# Patient Record
Sex: FEMALE | Race: WHITE | NOT HISPANIC OR LATINO | Employment: FULL TIME | URBAN - NONMETROPOLITAN AREA
[De-identification: names, ages, dates, MRNs, and addresses within clinical notes are randomized per-mention and may not be internally consistent; named-entity substitution may affect disease eponyms.]

---

## 2017-03-13 RX ORDER — NORETHINDRONE ACETATE AND ETHINYL ESTRADIOL 1; 20 MG/1; UG/1
TABLET ORAL
Qty: 28 TABLET | Refills: 0 | Status: SHIPPED | OUTPATIENT
Start: 2017-03-13 | End: 2017-04-27

## 2017-04-03 RX ORDER — NORETHINDRONE ACETATE AND ETHINYL ESTRADIOL 1; 20 MG/1; UG/1
TABLET ORAL
Qty: 63 TABLET | Refills: 0 | Status: SHIPPED | OUTPATIENT
Start: 2017-04-03 | End: 2017-04-27 | Stop reason: SDUPTHER

## 2017-04-27 ENCOUNTER — PROCEDURE VISIT (OUTPATIENT)
Dept: OBSTETRICS AND GYNECOLOGY | Facility: CLINIC | Age: 37
End: 2017-04-27

## 2017-04-27 VITALS
DIASTOLIC BLOOD PRESSURE: 74 MMHG | HEART RATE: 94 BPM | WEIGHT: 215 LBS | HEIGHT: 64 IN | BODY MASS INDEX: 36.7 KG/M2 | SYSTOLIC BLOOD PRESSURE: 124 MMHG

## 2017-04-27 DIAGNOSIS — Z30.41 SURVEILLANCE OF CONTRACEPTIVE PILL: Primary | ICD-10-CM

## 2017-04-27 PROCEDURE — 99213 OFFICE O/P EST LOW 20 MIN: CPT | Performed by: NURSE PRACTITIONER

## 2017-04-27 RX ORDER — NORETHINDRONE ACETATE AND ETHINYL ESTRADIOL 1; .02 MG/1; MG/1
1 TABLET ORAL DAILY
Qty: 90 TABLET | Refills: 4 | Status: SHIPPED | OUTPATIENT
Start: 2017-04-27 | End: 2018-05-30 | Stop reason: SDUPTHER

## 2017-04-27 NOTE — PROGRESS NOTES
Subjective   Jacqueline Horton is a 36 y.o. Here for birth control refills has no complaints.    Gynecologic Exam   The patient's pertinent negatives include no genital itching, genital lesions, genital odor, genital rash, missed menses, pelvic pain, vaginal bleeding or vaginal discharge. Pertinent negatives include no dysuria, headaches, nausea, rash or vomiting. She is sexually active. No, her partner does not have an STD. She uses oral contraceptives for contraception. Her menstrual history has been regular.   LMP- 2 weeks ago; usually last about 5 days and are normal flow with mild cramping. The last 2 periods were heavier than usual but she's thinking it was b/c she was sick and under a lot of stress the past 2 months.    Last pap- 3/10/16 normal with negative HPV    The following portions of the patient's history were reviewed and updated as appropriate: allergies, current medications, past family history, past medical history, past social history, past surgical history and problem list.    Review of Systems   Respiratory: Negative for apnea, chest tightness and shortness of breath.    Cardiovascular: Negative for chest pain, palpitations and leg swelling.   Gastrointestinal: Negative for nausea and vomiting.   Genitourinary: Negative for difficulty urinating, dyspareunia, dysuria, genital sores, menstrual problem, missed menses, pelvic pain, vaginal bleeding, vaginal discharge and vaginal pain.   Musculoskeletal: Negative for gait problem and myalgias.   Skin: Negative for color change and rash.   Neurological: Negative for weakness, light-headedness and headaches.   Hematological: Negative for adenopathy.   Psychiatric/Behavioral: Negative for dysphoric mood and sleep disturbance. The patient is not nervous/anxious.        Objective   Physical Exam   Constitutional: She is oriented to person, place, and time. She appears well-developed and well-nourished.   Cardiovascular: Normal rate, regular rhythm,  normal heart sounds and intact distal pulses.    Pulmonary/Chest: Effort normal and breath sounds normal.   Genitourinary:   Genitourinary Comments: Pap smear not indicated, no GYN complaints. Pelvic exam deferred.   Neurological: She is alert and oriented to person, place, and time.   Skin: Skin is warm, dry and intact.   Psychiatric: She has a normal mood and affect. Her behavior is normal.   Nursing note and vitals reviewed.        Assessment/Plan   Jacqueline was seen today for gynecologic exam.    Diagnoses and all orders for this visit:    Surveillance of contraceptive pill    Other orders  -     norethindrone-ethinyl estradiol (JUNEL 1/20) 1-20 MG-MCG per tablet; Take 1 tablet by mouth Daily.      Continue with OCP. If periods continue to be heavy over the next few cycles she is to call and request a change in the progesterone to help manage heavy bleeding.

## 2017-10-02 RX ORDER — NORETHINDRONE ACETATE AND ETHINYL ESTRADIOL 1; 20 MG/1; UG/1
TABLET ORAL
Qty: 21 TABLET | Refills: 4 | Status: SHIPPED | OUTPATIENT
Start: 2017-10-02 | End: 2018-06-28

## 2018-05-02 RX ORDER — NORETHINDRONE ACETATE AND ETHINYL ESTRADIOL 1; 20 MG/1; UG/1
1 TABLET ORAL DAILY
Qty: 63 TABLET | Refills: 0 | OUTPATIENT
Start: 2018-05-02

## 2018-05-30 RX ORDER — NORETHINDRONE ACETATE AND ETHINYL ESTRADIOL 1; .02 MG/1; MG/1
1 TABLET ORAL DAILY
Qty: 90 TABLET | Refills: 0 | Status: SHIPPED | OUTPATIENT
Start: 2018-05-30 | End: 2018-06-28 | Stop reason: SDUPTHER

## 2018-06-28 ENCOUNTER — OFFICE VISIT (OUTPATIENT)
Dept: OBSTETRICS AND GYNECOLOGY | Facility: CLINIC | Age: 38
End: 2018-06-28

## 2018-06-28 ENCOUNTER — APPOINTMENT (OUTPATIENT)
Dept: LAB | Facility: HOSPITAL | Age: 38
End: 2018-06-28

## 2018-06-28 VITALS
DIASTOLIC BLOOD PRESSURE: 74 MMHG | HEART RATE: 93 BPM | HEIGHT: 64 IN | WEIGHT: 215 LBS | SYSTOLIC BLOOD PRESSURE: 121 MMHG | BODY MASS INDEX: 36.7 KG/M2

## 2018-06-28 DIAGNOSIS — Z13.0 SCREENING, ANEMIA, DEFICIENCY, IRON: ICD-10-CM

## 2018-06-28 DIAGNOSIS — Z13.220 SCREENING CHOLESTEROL LEVEL: ICD-10-CM

## 2018-06-28 DIAGNOSIS — Z30.41 SURVEILLANCE OF CONTRACEPTIVE PILL: Primary | ICD-10-CM

## 2018-06-28 DIAGNOSIS — Z13.29 SCREENING FOR THYROID DISORDER: ICD-10-CM

## 2018-06-28 DIAGNOSIS — E66.09 CLASS 2 OBESITY DUE TO EXCESS CALORIES WITHOUT SERIOUS COMORBIDITY WITH BODY MASS INDEX (BMI) OF 36.0 TO 36.9 IN ADULT: ICD-10-CM

## 2018-06-28 DIAGNOSIS — Z13.1 SCREENING FOR DIABETES MELLITUS: ICD-10-CM

## 2018-06-28 LAB
ALBUMIN SERPL-MCNC: 4.3 G/DL (ref 3.4–4.8)
ALBUMIN/GLOB SERPL: 1.2 G/DL (ref 1.1–1.8)
ALP SERPL-CCNC: 48 U/L (ref 38–126)
ALT SERPL W P-5'-P-CCNC: 39 U/L (ref 9–52)
ANION GAP SERPL CALCULATED.3IONS-SCNC: 11 MMOL/L (ref 5–15)
ARTICHOKE IGE QN: 81 MG/DL (ref 1–129)
AST SERPL-CCNC: 35 U/L (ref 14–36)
BILIRUB SERPL-MCNC: 0.4 MG/DL (ref 0.2–1.3)
BUN BLD-MCNC: 19 MG/DL (ref 7–21)
BUN/CREAT SERPL: 29.7 (ref 7–25)
CALCIUM SPEC-SCNC: 9.1 MG/DL (ref 8.4–10.2)
CHLORIDE SERPL-SCNC: 104 MMOL/L (ref 95–110)
CHOLEST SERPL-MCNC: 165 MG/DL (ref 0–199)
CO2 SERPL-SCNC: 24 MMOL/L (ref 22–31)
CREAT BLD-MCNC: 0.64 MG/DL (ref 0.5–1)
DEPRECATED RDW RBC AUTO: 40.2 FL (ref 36.4–46.3)
ERYTHROCYTE [DISTWIDTH] IN BLOOD BY AUTOMATED COUNT: 12.5 % (ref 11.5–14.5)
GFR SERPL CREATININE-BSD FRML MDRD: 104 ML/MIN/1.73 (ref 64–149)
GLOBULIN UR ELPH-MCNC: 3.6 GM/DL (ref 2.3–3.5)
GLUCOSE BLD-MCNC: 88 MG/DL (ref 60–100)
HBA1C MFR BLD: 5.1 % (ref 4–5.6)
HCT VFR BLD AUTO: 38.9 % (ref 35–45)
HDLC SERPL-MCNC: 66 MG/DL (ref 60–200)
HGB BLD-MCNC: 13.2 G/DL (ref 12–15.5)
LDLC/HDLC SERPL: 1.31 {RATIO} (ref 0–3.22)
MCH RBC QN AUTO: 30 PG (ref 26.5–34)
MCHC RBC AUTO-ENTMCNC: 33.9 G/DL (ref 31.4–36)
MCV RBC AUTO: 88.4 FL (ref 80–98)
PLATELET # BLD AUTO: 286 10*3/MM3 (ref 150–450)
PMV BLD AUTO: 9.8 FL (ref 8–12)
POTASSIUM BLD-SCNC: 3.8 MMOL/L (ref 3.5–5.1)
PROT SERPL-MCNC: 7.9 G/DL (ref 6.3–8.6)
RBC # BLD AUTO: 4.4 10*6/MM3 (ref 3.77–5.16)
SODIUM BLD-SCNC: 139 MMOL/L (ref 137–145)
TRIGL SERPL-MCNC: 64 MG/DL (ref 20–199)
TSH SERPL DL<=0.05 MIU/L-ACNC: 0.86 MIU/ML (ref 0.46–4.68)
WBC NRBC COR # BLD: 7.4 10*3/MM3 (ref 3.2–9.8)

## 2018-06-28 PROCEDURE — 84443 ASSAY THYROID STIM HORMONE: CPT | Performed by: NURSE PRACTITIONER

## 2018-06-28 PROCEDURE — 99214 OFFICE O/P EST MOD 30 MIN: CPT | Performed by: NURSE PRACTITIONER

## 2018-06-28 PROCEDURE — 80053 COMPREHEN METABOLIC PANEL: CPT | Performed by: NURSE PRACTITIONER

## 2018-06-28 PROCEDURE — 85027 COMPLETE CBC AUTOMATED: CPT | Performed by: NURSE PRACTITIONER

## 2018-06-28 PROCEDURE — 83036 HEMOGLOBIN GLYCOSYLATED A1C: CPT | Performed by: NURSE PRACTITIONER

## 2018-06-28 PROCEDURE — 36415 COLL VENOUS BLD VENIPUNCTURE: CPT | Performed by: NURSE PRACTITIONER

## 2018-06-28 PROCEDURE — 80061 LIPID PANEL: CPT | Performed by: NURSE PRACTITIONER

## 2018-06-28 RX ORDER — NORETHINDRONE ACETATE AND ETHINYL ESTRADIOL 1; .02 MG/1; MG/1
1 TABLET ORAL DAILY
Qty: 84 TABLET | Refills: 4 | Status: SHIPPED | OUTPATIENT
Start: 2018-06-28 | End: 2019-08-01 | Stop reason: SDUPTHER

## 2018-06-28 NOTE — PROGRESS NOTES
Subjective   Jacqueline Horton is a 37 y.o. female. Needs refills on birth control pills.    LMP- 3 weeks ago  Last Pap- 3/10/16 normal with negative HPV    No PCP. Has not had any screenings other than pap smear in 2016    Gynecologic Exam   The patient's pertinent negatives include no genital itching, genital lesions, genital odor, genital rash, missed menses, pelvic pain, vaginal bleeding or vaginal discharge. Pertinent negatives include no abdominal pain, constipation, diarrhea, dysuria, headaches, nausea, rash, urgency or vomiting. She is sexually active. No, her partner does not have an STD. She uses oral contraceptives for contraception. Her menstrual history has been regular.       The following portions of the patient's history were reviewed and updated as appropriate: allergies, current medications, past family history, past medical history, past social history, past surgical history and problem list.    Review of Systems   Constitutional: Negative for activity change, appetite change, fatigue and unexpected weight change.   Respiratory: Negative for chest tightness and shortness of breath.    Cardiovascular: Negative for chest pain, palpitations and leg swelling.   Gastrointestinal: Negative for abdominal distention, abdominal pain, blood in stool, constipation, diarrhea, nausea and vomiting.   Endocrine: Negative for cold intolerance, heat intolerance, polydipsia, polyphagia and polyuria.   Genitourinary: Negative for difficulty urinating, dyspareunia, dysuria, genital sores, menstrual problem, missed menses, pelvic pain, urgency, vaginal bleeding, vaginal discharge and vaginal pain.   Musculoskeletal: Negative for gait problem and myalgias.   Skin: Negative for color change, pallor and rash.   Neurological: Negative for dizziness, weakness, light-headedness and headaches.   Hematological: Negative for adenopathy.   Psychiatric/Behavioral: Negative for agitation, confusion, dysphoric mood, self-injury  and suicidal ideas. The patient is not nervous/anxious.        Objective   Physical Exam   Constitutional: She is oriented to person, place, and time. She appears well-developed and well-nourished. No distress.   Neck: No thyroid mass and no thyromegaly present.   Cardiovascular: Normal rate, regular rhythm and normal heart sounds.    Pulmonary/Chest: Effort normal and breath sounds normal.   Neurological: She is alert and oriented to person, place, and time.   Skin: Skin is warm and dry. Capillary refill takes less than 2 seconds. She is not diaphoretic.   Psychiatric: She has a normal mood and affect. Her behavior is normal.   Nursing note and vitals reviewed.      Assessment/Plan   Jacqueline was seen today for contraception.    Diagnoses and all orders for this visit:    Surveillance of contraceptive pill    Screening for diabetes mellitus  -     Comprehensive Metabolic Panel  -     Hemoglobin A1c    Screening for thyroid disorder  -     TSH    Screening cholesterol level  -     Lipid Panel    Class 2 obesity due to excess calories without serious comorbidity with body mass index (BMI) of 36.0 to 36.9 in adult    Screening, anemia, deficiency, iron  -     CBC (No Diff)    Other orders  -     norethindrone-ethinyl estradiol (JUNEL 1/20) 1-20 MG-MCG per tablet; Take 1 tablet by mouth Daily.    Continue OCPs at this time. Screening labs today; will call with results. Needs to either establish with a PCP or f/u annually for OCP refills with labs at least every other year.

## 2019-08-01 RX ORDER — NORETHINDRONE ACETATE AND ETHINYL ESTRADIOL 1; 20 MG/1; UG/1
TABLET ORAL
Qty: 28 TABLET | Refills: 1 | Status: SHIPPED | OUTPATIENT
Start: 2019-08-01